# Patient Record
Sex: FEMALE | Race: WHITE
[De-identification: names, ages, dates, MRNs, and addresses within clinical notes are randomized per-mention and may not be internally consistent; named-entity substitution may affect disease eponyms.]

---

## 2018-01-08 ENCOUNTER — HOSPITAL ENCOUNTER (EMERGENCY)
Dept: HOSPITAL 5 - ED | Age: 58
Discharge: HOME | End: 2018-01-08
Payer: COMMERCIAL

## 2018-01-08 VITALS — DIASTOLIC BLOOD PRESSURE: 76 MMHG | SYSTOLIC BLOOD PRESSURE: 152 MMHG

## 2018-01-08 DIAGNOSIS — S16.1XXA: Primary | ICD-10-CM
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
## 2018-01-08 DIAGNOSIS — R56.9: ICD-10-CM
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
## 2018-01-08 DIAGNOSIS — D50.9: ICD-10-CM

## 2018-01-08 DIAGNOSIS — Z79.82: ICD-10-CM

## 2018-01-08 DIAGNOSIS — M25.511: ICD-10-CM

## 2018-01-08 DIAGNOSIS — I10: ICD-10-CM

## 2018-01-08 DIAGNOSIS — E11.9: ICD-10-CM
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
## 2018-01-08 PROCEDURE — 96372 THER/PROPH/DIAG INJ SC/IM: CPT

## 2018-01-08 PROCEDURE — 73030 X-RAY EXAM OF SHOULDER: CPT

## 2018-01-08 PROCEDURE — 99283 EMERGENCY DEPT VISIT LOW MDM: CPT
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
## 2019-04-11 ENCOUNTER — HOSPITAL ENCOUNTER (EMERGENCY)
Dept: HOSPITAL 5 - ED | Age: 59
Discharge: HOME HEALTH | End: 2019-04-11
Payer: COMMERCIAL

## 2019-04-11 VITALS — DIASTOLIC BLOOD PRESSURE: 72 MMHG | SYSTOLIC BLOOD PRESSURE: 142 MMHG

## 2019-04-11 DIAGNOSIS — Z79.82: ICD-10-CM

## 2019-04-11 DIAGNOSIS — M25.572: Primary | ICD-10-CM

## 2019-04-11 DIAGNOSIS — I10: ICD-10-CM

## 2019-04-11 DIAGNOSIS — M25.512: ICD-10-CM

## 2019-04-11 DIAGNOSIS — E11.9: ICD-10-CM

## 2019-04-11 PROCEDURE — 99282 EMERGENCY DEPT VISIT SF MDM: CPT

## 2019-04-11 NOTE — EMERGENCY DEPARTMENT REPORT
ED General Adult HPI





- General


Chief complaint: Extremity Problem,Nontraumatic


Stated complaint: RIGHT SIDE PAIN/FOOT PAIN


Time Seen by Provider: 19 06:20


Source: patient


Mode of arrival: Ambulatory


Limitations: No Limitations





- History of Present Illness


Initial comments: 


Patient's a 58-year-old female with a history of diabetes and hypertension p

resents for left foot pain with intermittent swelling patient states this is a 

chronic problem for the last 2+ years status is on NSAIDs patient has PCP Dr. Sheridan by her she can see Dr. Sheridan for another 2 weeks pain is described as 

5/10 and aching with intermittent swelling pain is relieved by rest and 

elevation pain is exacerbated by overuse patient has not been diagnosed with 

diabetic neuropathy patient is told to baseline per patient patient denies 

injury or trauma patient requested pain medication.  


Onset/Timin


-: year(s)


Location: upper extremity, lower extremity


Radiation: non-radiation


Severity scale (0 -10): 5


Quality: aching


Consistency: intermittent


Improves with: rest


Worsens with: movement


Associated Symptoms: denies other symptoms


Treatments Prior to Arrival: none





- Related Data


                                Home Medications











 Medication  Instructions  Recorded  Confirmed  Last Taken


 


Aspirin [Aspirin BABY CHEW TAB] 81 mg PO QDAY 03/31/15 11/04/17 1 Day Ago





    ~17





    81mg








                                  Previous Rx's











 Medication  Instructions  Recorded  Last Taken  Type


 


Lisinopril [Zestril] 40 mg PO DAILY #30 tablet 17 Unknown Rx


 


metFORMIN [Glucophage] 1,000 mg PO BID #60 tablet 17 Unknown Rx


 


Cyclobenzaprine [Flexeril] 10 mg PO TID PRN 4 Days #12 tablet 18 Unknown 

Rx


 


Ibuprofen [Motrin] 600 mg PO Q8H PRN 3 Days #9 tablet 18 Unknown Rx


 


Ibuprofen 800 mg PO TID PRN #30 tablet 19 Unknown Rx


 


Menthol/Camphor [Tiger Balm 1 applicatio TP QID PRN #1 tube 19 Unknown Rx





Ointment]    


 


predniSONE [Deltasone] 40 mg PO QDAY 5 Days #10 tab 19 Unknown Rx











                                    Allergies











Allergy/AdvReac Type Severity Reaction Status Date / Time


 


No Known Allergies Allergy   Unverified 14 20:16














ED Review of Systems


ROS: 


Stated complaint: RIGHT SIDE PAIN/FOOT PAIN


Other details as noted in HPI





Constitutional: denies: chills, fever


Eyes: denies: eye pain, eye discharge, vision change


ENT: denies: ear pain, throat pain


Respiratory: denies: cough, shortness of breath, wheezing


Cardiovascular: denies: chest pain, palpitations


Endocrine: no symptoms reported


Gastrointestinal: denies: abdominal pain, nausea, diarrhea


Genitourinary: denies: urgency, dysuria, discharge


Musculoskeletal: arthralgia, myalgia, other (left shoulder and dang).  denies: 

back pain, joint swelling


Skin: denies: rash, lesions


Neurological: denies: headache, weakness, paresthesias


Psychiatric: denies: anxiety, depression


Hematological/Lymphatic: denies: easy bleeding, easy bruising





ED Past Medical Hx





- Past Medical History


Previous Medical History?: Yes


Hx Hypertension: Yes


Hx Congestive Heart Failure: No


Hx Diabetes: Yes


Hx Liver Disease: No


Hx Sickle Cell Disease: No


Hx Seizures: Yes


Hx Asthma: No


Hx COPD: No


Hx HIV: No


Additional medical history: Iron deficiency anemia





- Surgical History


Past Surgical History?: No





- Social History


Smoking Status: Never Smoker





- Medications


Home Medications: 


                                Home Medications











 Medication  Instructions  Recorded  Confirmed  Last Taken  Type


 


Aspirin [Aspirin BABY CHEW TAB] 81 mg PO QDAY 03/31/15 11/04/17 1 Day Ago 

History





    ~17 





    81mg 


 


Lisinopril [Zestril] 40 mg PO DAILY #30 tablet 17  Unknown Rx


 


metFORMIN [Glucophage] 1,000 mg PO BID #60 tablet 17  Unknown Rx


 


Cyclobenzaprine [Flexeril] 10 mg PO TID PRN 4 Days #12 tablet 18  Unknown 

Rx


 


Ibuprofen [Motrin] 600 mg PO Q8H PRN 3 Days #9 tablet 18  Unknown Rx


 


Ibuprofen 800 mg PO TID PRN #30 tablet 19  Unknown Rx


 


Menthol/Camphor [Tiger Balm 1 applicatio TP QID PRN #1 tube 19  Unknown Rx





Ointment]     


 


predniSONE [Deltasone] 40 mg PO QDAY 5 Days #10 tab 19  Unknown Rx














ED Physical Exam





- General


Limitations: No Limitations


General appearance: alert, in no apparent distress





- Head


Head exam: Present: atraumatic, normocephalic





- Eye


Eye exam: Present: normal appearance, PERRL, EOMI


Pupils: Present: normal accommodation





- ENT


ENT exam: Present: normal orophraynx, mucous membranes moist





- Neck


Neck exam: Present: normal inspection, tenderness, full ROM.  Absent: 

meningismus, lymphadenopathy, thyromegaly





- Respiratory


Respiratory exam: Present: normal lung sounds bilaterally.  Absent: respiratory 

distress, wheezes, rhonchi, chest wall tenderness





- Cardiovascular


Cardiovascular Exam: Present: regular rate, normal rhythm.  Absent: systolic 

murmur, diastolic murmur, rubs, gallop





- GI/Abdominal


GI/Abdominal exam: Present: soft, normal bowel sounds.  Absent: distended, 

tenderness, rebound, bruit, hernia





- Rectal


Rectal exam: Present: deferred





- Extremities Exam


Extremities exam: Present: normal inspection, full ROM, tenderness (left lateral

 ankle and  shoulders ), normal capillary refill.  Absent: pedal edema, joint 

swelling, calf tenderness





- Expanded Upper Extremity Exam


  ** Left


Shoulder Exam: Absent: full ROM, tenderness, swelling, abrasion, laceration, 

ecchymosis, deformity, crepidus, dislocation, erythema, tenderness over AC joint


Upper Arm exam: Present: normal inspection, full ROM.  Absent: tenderness


Elbow exam: Present: normal inspection, full ROM.  Absent: tenderness


Forearm Wrist exam: Present: normal inspection, full ROM.  Absent: tenderness


Neuro motor exam: Present: wrist extension intact, thumb opposition intact, 

thumb IP flexion intact, thumb adduction intact, fingers 2-5 abduction intact


Neurosensory exam: Present: 2-point discrimination, other


Vascular: Present: normal capillary refill, radial pulse, brachial pulse, ulnar 

pulse.  Absent: vascular compromise, pulse deficit radial art, pulse deficit 

ulnar art, pulse deficit brachial art





- Back Exam


Back exam: Present: normal inspection, full ROM.  Absent: tenderness, CVA 

tenderness (R), CVA tenderness (L), muscle spasm, paraspinal tenderness, 

vertebral tenderness, rash noted





- Neurological Exam


Neurological exam: Present: alert, oriented X3, CN II-XII intact, normal gait, 

reflexes normal.  Absent: motor sensory deficit





- Psychiatric


Psychiatric exam: Present: normal affect, normal mood





- Skin


Skin exam: Present: warm, dry, intact, normal color.  Absent: rash





ED Course





                                   Vital Signs











  19





  01:24


 


Temperature 98.4 F


 


Pulse Rate 77


 


Respiratory 18





Rate 


 


Blood Pressure 157/77


 


O2 Sat by Pulse 100





Oximetry 











Critical care attestation.: 


If time is entered above; I have spent that time in minutes in the direct care 

of this critically ill patient, excluding procedure time.








ED Disposition


Clinical Impression: 


Arthralgia


Qualifiers:


 Joint pain location: unspecified Qualified Code(s): M25.50 - Pain in 

unspecified joint





Disposition: DC/TX-06 HOME UNDER HOME HLTH


Is pt being admited?: No


Does the pt Need Aspirin: No


Condition: Stable


Instructions:  Arthralgia (ED)


Prescriptions: 


predniSONE [Deltasone] 40 mg PO QDAY 5 Days #10 tab


Ibuprofen 800 mg PO TID PRN #30 tablet


 PRN Reason: pain


Menthol/Camphor [Tiger Balm Ointment] 1 applicatio TP QID PRN #1 tube


 PRN Reason: pain


Referrals: 


[facility] [Outside] - 3-5 Days


Forms:  Work/School Release Form(ED)


Time of Disposition: 07:02

## 2021-02-15 ENCOUNTER — OFFICE VISIT (OUTPATIENT)
Dept: URBAN - METROPOLITAN AREA CLINIC 109 | Facility: CLINIC | Age: 61
End: 2021-02-15

## 2021-10-23 ENCOUNTER — HOSPITAL ENCOUNTER (EMERGENCY)
Dept: HOSPITAL 5 - ED | Age: 61
Discharge: LEFT BEFORE DISCHARGE | End: 2021-10-23
Payer: COMMERCIAL

## 2021-10-23 VITALS — SYSTOLIC BLOOD PRESSURE: 189 MMHG | DIASTOLIC BLOOD PRESSURE: 80 MMHG

## 2021-10-23 DIAGNOSIS — Z53.21: ICD-10-CM

## 2021-10-23 DIAGNOSIS — R51.9: Primary | ICD-10-CM

## 2024-10-07 ENCOUNTER — DASHBOARD ENCOUNTERS (OUTPATIENT)
Age: 64
End: 2024-10-07

## 2024-10-07 ENCOUNTER — OFFICE VISIT (OUTPATIENT)
Dept: URBAN - METROPOLITAN AREA CLINIC 109 | Facility: CLINIC | Age: 64
End: 2024-10-07
Payer: COMMERCIAL

## 2024-10-07 ENCOUNTER — LAB OUTSIDE AN ENCOUNTER (OUTPATIENT)
Dept: URBAN - METROPOLITAN AREA CLINIC 109 | Facility: CLINIC | Age: 64
End: 2024-10-07

## 2024-10-07 VITALS
HEART RATE: 89 BPM | SYSTOLIC BLOOD PRESSURE: 148 MMHG | TEMPERATURE: 97.2 F | BODY MASS INDEX: 42.86 KG/M2 | HEIGHT: 61 IN | WEIGHT: 227 LBS | DIASTOLIC BLOOD PRESSURE: 75 MMHG

## 2024-10-07 DIAGNOSIS — Z12.11 COLON CANCER SCREENING: ICD-10-CM

## 2024-10-07 DIAGNOSIS — R13.19 ESOPHAGEAL DYSPHAGIA: ICD-10-CM

## 2024-10-07 DIAGNOSIS — R10.13 DYSPEPSIA: ICD-10-CM

## 2024-10-07 DIAGNOSIS — R12 HEARTBURN: ICD-10-CM

## 2024-10-07 DIAGNOSIS — Z80.0 FAMILY HX OF COLON CANCER: ICD-10-CM

## 2024-10-07 PROCEDURE — 99204 OFFICE O/P NEW MOD 45 MIN: CPT | Performed by: INTERNAL MEDICINE

## 2024-10-07 RX ORDER — GLIPIZIDE 5 MG/1
TAKE 1 TABLET (5 MG) BY ORAL ROUTE 2 TIMES PER DAY BEFORE MEALS TABLET ORAL 2
Qty: 0 | Refills: 0 | Status: ACTIVE | COMMUNITY
Start: 1900-01-01

## 2024-10-07 RX ORDER — ASPIRIN 81 MG/1
CHEW 1 TABLET (81 MG) BY ORAL ROUTE ONCE DAILY TABLET, CHEWABLE ORAL 1
Qty: 0 | Refills: 0 | Status: ACTIVE | COMMUNITY
Start: 1900-01-01

## 2024-10-07 NOTE — HPI-TODAY'S VISIT:
Pt is a 64 yo female referred for screening colonoscopy. A copy of this document will be sent to the referring provider.  Prior Colon/EGD with Dr. Eros Romero as below.  No signs or sxs of GI bleeding, abd pain, N/V, fever/chills, unintentional wt. loss or change in BMs.  2-3 formed BM/day. Reports chronic heartburn and indigestion/belching 1-4xs/wk. Tums help.  New onset dysphagia to solids in epigastric area for ~6 months.  No dysphagia to liquids or odynophagia. Occasional early satiety.  Denies frequent alcohol or NSAIDs outside of daily ASA 81mg.  No hx of HP or PUD.   FHX notable for GI cancer in many family members including colon CA in sister (59yo age of dx), esophageal CA in brother (54yo), pancreatic CA in sister (21yo), & gastric CA in niece (53yo). Also son w/ IBD (29yo).  Recent w/u with PCP unremarkable except labs w/ elevated TGs to 159, glucose to 191, AST minimally to 42.  Denies new onset DM.  No heart, lung, or renal disease. No blood thinner or O2 use.  Colon/EGD 1/2020: EGD with 1 cm hiatal hernia, otherwise normal. Colonoscopy was normal except IH and sigmoid tics. Biopsies taken to r/o celiac and microscopic colitis, both negative w/ benign minimal reactive changes.

## 2024-10-08 ENCOUNTER — TELEPHONE ENCOUNTER (OUTPATIENT)
Dept: URBAN - METROPOLITAN AREA CLINIC 6 | Facility: CLINIC | Age: 64
End: 2024-10-08

## 2024-10-14 ENCOUNTER — TELEPHONE ENCOUNTER (OUTPATIENT)
Dept: URBAN - METROPOLITAN AREA CLINIC 109 | Facility: CLINIC | Age: 64
End: 2024-10-14

## 2024-10-17 ENCOUNTER — OFFICE VISIT (OUTPATIENT)
Dept: URBAN - METROPOLITAN AREA SURGERY CENTER 23 | Facility: SURGERY CENTER | Age: 64
End: 2024-10-17

## 2024-10-23 ENCOUNTER — OFFICE VISIT (OUTPATIENT)
Dept: URBAN - METROPOLITAN AREA MEDICAL CENTER 16 | Facility: MEDICAL CENTER | Age: 64
End: 2024-10-23